# Patient Record
Sex: FEMALE | Race: WHITE | NOT HISPANIC OR LATINO | Employment: PART TIME | URBAN - METROPOLITAN AREA
[De-identification: names, ages, dates, MRNs, and addresses within clinical notes are randomized per-mention and may not be internally consistent; named-entity substitution may affect disease eponyms.]

---

## 2022-09-12 ENCOUNTER — HOSPITAL ENCOUNTER (EMERGENCY)
Facility: CLINIC | Age: 67
Discharge: HOME | End: 2022-09-12
Attending: EMERGENCY MEDICINE | Admitting: EMERGENCY MEDICINE
Payer: COMMERCIAL

## 2022-09-12 ENCOUNTER — APPOINTMENT (OUTPATIENT)
Dept: GENERAL RADIOLOGY | Facility: CLINIC | Age: 67
End: 2022-09-12
Attending: EMERGENCY MEDICINE
Payer: COMMERCIAL

## 2022-09-12 VITALS
OXYGEN SATURATION: 97 % | HEIGHT: 59 IN | TEMPERATURE: 97.7 F | RESPIRATION RATE: 13 BRPM | DIASTOLIC BLOOD PRESSURE: 75 MMHG | SYSTOLIC BLOOD PRESSURE: 133 MMHG | BODY MASS INDEX: 31.65 KG/M2 | WEIGHT: 157 LBS | HEART RATE: 67 BPM

## 2022-09-12 DIAGNOSIS — R68.84 JAW PAIN: ICD-10-CM

## 2022-09-12 DIAGNOSIS — R07.9 ACUTE CHEST PAIN: ICD-10-CM

## 2022-09-12 LAB
ANION GAP SERPL CALCULATED.3IONS-SCNC: 14 MMOL/L (ref 7–15)
BASOPHILS # BLD AUTO: 0.1 10E3/UL (ref 0–0.2)
BASOPHILS NFR BLD AUTO: 1 %
BUN SERPL-MCNC: 14.7 MG/DL (ref 8–23)
CALCIUM SERPL-MCNC: 9.7 MG/DL (ref 8.8–10.2)
CHLORIDE SERPL-SCNC: 103 MMOL/L (ref 98–107)
CREAT SERPL-MCNC: 0.62 MG/DL (ref 0.51–0.95)
DEPRECATED HCO3 PLAS-SCNC: 23 MMOL/L (ref 22–29)
EOSINOPHIL # BLD AUTO: 0.1 10E3/UL (ref 0–0.7)
EOSINOPHIL NFR BLD AUTO: 1 %
ERYTHROCYTE [DISTWIDTH] IN BLOOD BY AUTOMATED COUNT: 13 % (ref 10–15)
GFR SERPL CREATININE-BSD FRML MDRD: >90 ML/MIN/1.73M2
GLUCOSE SERPL-MCNC: 137 MG/DL (ref 70–99)
HCT VFR BLD AUTO: 45.4 % (ref 35–47)
HGB BLD-MCNC: 15 G/DL (ref 11.7–15.7)
HOLD SPECIMEN: NORMAL
HOLD SPECIMEN: NORMAL
IMM GRANULOCYTES # BLD: 0 10E3/UL
IMM GRANULOCYTES NFR BLD: 1 %
LYMPHOCYTES # BLD AUTO: 3.2 10E3/UL (ref 0.8–5.3)
LYMPHOCYTES NFR BLD AUTO: 37 %
MCH RBC QN AUTO: 28.8 PG (ref 26.5–33)
MCHC RBC AUTO-ENTMCNC: 33 G/DL (ref 31.5–36.5)
MCV RBC AUTO: 87 FL (ref 78–100)
MONOCYTES # BLD AUTO: 0.5 10E3/UL (ref 0–1.3)
MONOCYTES NFR BLD AUTO: 6 %
NEUTROPHILS # BLD AUTO: 4.8 10E3/UL (ref 1.6–8.3)
NEUTROPHILS NFR BLD AUTO: 54 %
NRBC # BLD AUTO: 0 10E3/UL
NRBC BLD AUTO-RTO: 0 /100
PLATELET # BLD AUTO: 309 10E3/UL (ref 150–450)
POTASSIUM SERPL-SCNC: 4.1 MMOL/L (ref 3.4–5.3)
RBC # BLD AUTO: 5.21 10E6/UL (ref 3.8–5.2)
SODIUM SERPL-SCNC: 140 MMOL/L (ref 136–145)
TROPONIN T SERPL HS-MCNC: 6 NG/L
TROPONIN T SERPL HS-MCNC: <6 NG/L
WBC # BLD AUTO: 8.7 10E3/UL (ref 4–11)

## 2022-09-12 PROCEDURE — 85025 COMPLETE CBC W/AUTO DIFF WBC: CPT | Performed by: EMERGENCY MEDICINE

## 2022-09-12 PROCEDURE — 36415 COLL VENOUS BLD VENIPUNCTURE: CPT | Performed by: EMERGENCY MEDICINE

## 2022-09-12 PROCEDURE — 99285 EMERGENCY DEPT VISIT HI MDM: CPT | Mod: 25

## 2022-09-12 PROCEDURE — 71046 X-RAY EXAM CHEST 2 VIEWS: CPT

## 2022-09-12 PROCEDURE — 84484 ASSAY OF TROPONIN QUANT: CPT | Performed by: EMERGENCY MEDICINE

## 2022-09-12 PROCEDURE — 82310 ASSAY OF CALCIUM: CPT | Performed by: EMERGENCY MEDICINE

## 2022-09-12 PROCEDURE — 93005 ELECTROCARDIOGRAM TRACING: CPT

## 2022-09-12 PROCEDURE — 93005 ELECTROCARDIOGRAM TRACING: CPT | Mod: 76

## 2022-09-12 RX ORDER — LIDOCAINE 40 MG/G
CREAM TOPICAL
Status: DISCONTINUED
Start: 2022-09-12 | End: 2022-09-12 | Stop reason: HOSPADM

## 2022-09-12 ASSESSMENT — ENCOUNTER SYMPTOMS
NERVOUS/ANXIOUS: 1
NECK PAIN: 1
SHORTNESS OF BREATH: 1
BACK PAIN: 1

## 2022-09-12 ASSESSMENT — ACTIVITIES OF DAILY LIVING (ADL): ADLS_ACUITY_SCORE: 35

## 2022-09-12 NOTE — ED TRIAGE NOTES
bilateral jaw pain that started about 1pm. Pain radiates down neck to chest. Has happened before, but resolved without intervention.

## 2022-09-13 LAB
ATRIAL RATE - MUSE: 67 BPM
ATRIAL RATE - MUSE: 92 BPM
DIASTOLIC BLOOD PRESSURE - MUSE: NORMAL MMHG
DIASTOLIC BLOOD PRESSURE - MUSE: NORMAL MMHG
INTERPRETATION ECG - MUSE: NORMAL
INTERPRETATION ECG - MUSE: NORMAL
P AXIS - MUSE: 39 DEGREES
P AXIS - MUSE: 56 DEGREES
PR INTERVAL - MUSE: 132 MS
PR INTERVAL - MUSE: 134 MS
QRS DURATION - MUSE: 78 MS
QRS DURATION - MUSE: 80 MS
QT - MUSE: 370 MS
QT - MUSE: 436 MS
QTC - MUSE: 457 MS
QTC - MUSE: 460 MS
R AXIS - MUSE: -25 DEGREES
R AXIS - MUSE: -34 DEGREES
SYSTOLIC BLOOD PRESSURE - MUSE: NORMAL MMHG
SYSTOLIC BLOOD PRESSURE - MUSE: NORMAL MMHG
T AXIS - MUSE: 20 DEGREES
T AXIS - MUSE: 51 DEGREES
VENTRICULAR RATE- MUSE: 67 BPM
VENTRICULAR RATE- MUSE: 92 BPM

## 2022-09-13 NOTE — DISCHARGE INSTRUCTIONS
Please monitor your symptoms closely.  Return immediately if develop recurrent pain, chest pain, shortness of breath, or any other new or troubling symptoms.    Follow-up with your primary care provider in 2 to 3 days for recheck.    You are always welcome to return to the ED for reassessment if any concerns arise    Discharge Instructions  Chest Pain    You have been seen today for chest pain or discomfort.  At this time, your provider has found no signs that your chest pain is due to a serious or life-threatening condition, (or you have declined more testing and/or admission to the hospital). However, sometimes there is a serious problem that does not show up right away. Your evaluation today may not be complete and you may need further testing and evaluation.     Generally, every Emergency Department visit should have a follow-up clinic visit with either a primary or a specialty clinic/provider. Please follow-up as instructed by your emergency provider today.  Return to the Emergency Department if:  Your chest pain changes, gets worse, starts to happen more often, or comes with less activity.  You are newly short of breath.  You get very weak or tired.  You pass out or faint.  You have any new symptoms, like fever, cough, numb legs, or you cough up blood.  You have anything else that worries you.    Until you follow-up with your regular provider, please do the following:  Take one aspirin daily unless you have an allergy or are told not to by your provider.  If a stress test appointment has been made, go to the appointment.  If you have questions, contact your regular provider.  Follow-up with your regular provider/clinic as directed; this is very important.    If you were given a prescription for medicine here today, be sure to read all of the information (including the package insert) that comes with your prescription.  This will include important information about the medicine, its side effects, and any  warnings that you need to know about.  The pharmacist who fills the prescription can provide more information and answer questions you may have about the medicine.  If you have questions or concerns that the pharmacist cannot address, please call or return to the Emergency Department.       Remember that you can always come back to the Emergency Department if you are not able to see your regular provider in the amount of time listed above, if you get any new symptoms, or if there is anything that worries you.

## 2022-09-13 NOTE — ED PROVIDER NOTES
History   Chief Complaint:  Jaw Pain and Chest Pain       The history is provided by the patient.      Virginia Pan is a 67 year old female who presents with jaw pain that radiates down her neck and into her chest and back. 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 Virginia notes that it lasted about 5 minutes before complete resolution.  She noted the pain began along the anterior lower jaw, that subsequently migrated down the anterior aspect of her neck into her chest and back.  When it began, the patient noted that she was under significant stress at work.  She also notes her sister is in the hospital, and her  is currently undergoing cancer treatment through [facility].  She immediately stood up and walked around.  She denied that her symptoms exacerbated with exertion.  She denied any radiation of pain towards her shoulders nor down her arms.  She noted mild shortness of breath though denied any diaphoresis or nausea.  She notes a similar episode of pain that occurred approximately 1 year previous after her aunt .  That 2 lasted for about 5 minutes.  He notes a prior history of heartburn, though states her symptoms feel distinctly different than that.  She was not chewing or biting at the time of her symptom onset today.  Her symptoms resolved without any medication.  Here in the ED, she is otherwise asymptomatic.  She has had no recurrence of symptoms over the course of the day.  She denies any pain to the remainder of her joints.  She denies any clicking of her jaw.  She has no prior history of TMJ dysfunction.  She denies any history of known cardiac disease including coronary artery disease, nor she diagnosed with hypertension, hyperlipidemia, nor diabetes.  She has no personal or family history of aortic aneurysm or aortic dissection.  She is unaware of any history of collagen vascular disease such as Marfan's or Jody-Danlos syndrome.   "She is unsure if she grinds her teeth at night.  She denies any recent dental procedures, focal dental pain, facial swelling, or difficulty swallowing.      Review of Systems   HENT: Negative for dental problem.    Respiratory: Positive for shortness of breath.    Cardiovascular: Positive for chest pain.   Musculoskeletal: Positive for back pain and neck pain.        Jaw pain (+)   Psychiatric/Behavioral: The patient is nervous/anxious.         Stress (+)   All other systems reviewed and are negative.      Allergies:  Quinolones  Clindamycin  Ofloxacin    Medications:  Denies regular medication use    Past Medical History:     AUB  Overweight     Past Surgical History:    Cervical colposcopy, biopsy, and endocervical curettage  D&C  Laparoscopy  Cervical LEEP  Rectovaginal fistula repair  Tubal ligation  Salpingectomy  Hysteroscopy with ablation  Breast reduction  Abdominoplasty     Social History:  The patient presents to the ED with her  Jen.  The patient presents to the ED via car.  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  Her sister is currently hospitalized.  Her  has cancer.  She is stressed at work  PCP: eLonor Montgomery     Physical Exam     Patient Vitals for the past 24 hrs:   BP Temp Temp src Pulse Resp SpO2 Height Weight   09/12/22 2150 133/75 -- -- 67 -- 97 % -- --   09/12/22 2030 (!) 141/76 -- -- 68 13 98 % -- --   09/12/22 2020 -- -- -- 71 20 95 % -- --   09/12/22 2010 -- -- -- 68 11 98 % -- --   09/12/22 2000 (!) 151/85 -- -- 72 20 100 % -- --   09/12/22 1611 (!) 149/75 -- -- -- -- -- -- --   09/12/22 1610 -- 97.7  F (36.5  C) Temporal 91 16 99 % 1.499 m (4' 11\") 71.2 kg (157 lb)       Physical Exam  General:              Well-nourished              Speaking in full sentences  Eyes:              Conjunctiva without injection or scleral icterus              PERRL              EOM full w/out entrapment or proptosis  ENT:              Moist mucous membranes              Posterior " oropharynx clear without erythema or exudate              No tonsillar hypertrophy, exudate, asymmetry, nor uvular deviation              No oral lesions              Nares patent              Pinnae normal              No midface swelling, erythema, or asymmetry              Mild tenderness to palpation over TMJ joint  Neck:              Full ROM              No stiffness appreciated              No overlying skin changes or swelling  Resp:              Lungs CTAB              No crackles, wheezing or audible rubs              Good air movement  CV:                    Normal rate, regular rhythm              S1 and S2 present              No murmur, gallop or rub   Radial pulse 2+ bilaterally and symmetric  GI:              BS present              Abdomen soft without distention              Non-tender to light and deep palpation              No guarding or rebound tenderness  Skin:              Warm, dry, well perfused              No rashes or open wounds on exposed skin  MSK:              Moves all extremities              No focal deformities or swelling  Neuro:              Alert              Answers questions appropriately              Moves all extremities equally              Gait stable  Psych:              Normal affect, normal mood    Emergency Department Course   ECG #1  ECG taken at 1601, ECG read at 2017  Normal sinus rhythm   Left axis deviation  Non-specific ST and T wave abnormality    Rate 92 bpm. PA interval 134 ms. QRS duration 78 ms. QT/QTc 370/457 ms. P-R-T axes 39 -34 51.     ECG #2  ECG taken at 2038, ECG read at 2042  Sinus rhythm, non-specific ST - T wave abnormality    Rate 67 bpm. PA interval 132 ms. QRS duration 80 ms. QT/QTc 436/460 ms. P-R-T axes 56 -25 20.     Imaging:  Chest XR,  PA & LAT   Final Result   IMPRESSION: Negative chest.        Report per radiology    Laboratory:  Labs Ordered and Resulted from Time of ED Arrival to Time of ED Departure   BASIC METABOLIC PANEL - Abnormal        Result Value    Creatinine 0.62      Sodium 140      Potassium 4.1      Urea Nitrogen 14.7      Chloride 103      Carbon Dioxide (CO2) 23      Anion Gap 14      Glucose 137 (*)     GFR Estimate >90      Calcium 9.7     CBC WITH PLATELETS AND DIFFERENTIAL - Abnormal    WBC Count 8.7      RBC Count 5.21 (*)     Hemoglobin 15.0      Hematocrit 45.4      MCV 87      MCH 28.8      MCHC 33.0      RDW 13.0      Platelet Count 309      % Neutrophils 54      % Lymphocytes 37      % Monocytes 6      % Eosinophils 1      % Basophils 1      % Immature Granulocytes 1      NRBCs per 100 WBC 0      Absolute Neutrophils 4.8      Absolute Lymphocytes 3.2      Absolute Monocytes 0.5      Absolute Eosinophils 0.1      Absolute Basophils 0.1      Absolute Immature Granulocytes 0.0      Absolute NRBCs 0.0     TROPONIN T, HIGH SENSITIVITY - Normal    Troponin T, High Sensitivity <6     TROPONIN T, HIGH SENSITIVITY - Normal    Troponin T, High Sensitivity 6              Emergency Department Course:     Reviewed:  I reviewed nursing notes, vitals, past medical history and Care Everywhere    Assessments:  2017 I obtained history and examined the patient as noted above.   2140 I rechecked the patient and explained findings. The patient remains asymptomatic. We talked about risk benefits of further evaluation for aortic pathology complete CT imaging. She does not want to pursue any additional imaging.    Disposition:  The patient was discharged to home.     Impression & Plan     Medical Decision Making:  Virginia Pan is a very pleasant 67-year-old female who presents to the ED accompanied by her spouse for evaluation of jaw pain and chest pain.  VS on presentation reveal elevated BP which improved during her ED course.  Differential diagnosis is broad, including though was not limited to, referred cardiac ischemia, pneumothorax, pneumonia, esophageal spasm, esophageal rupture, TMJ, pharyngitis, deep space neck infection, facial  cellulitis, referred odontogenic source, aortic dissection, aortic aneurysm, acute stress reaction, among others.  History, exam, and ED course as outlined above.  At this time, precise etiology for patient's transient jaw and chest discomfort is not entirely clear.  Acute coronary syndrome strongly considered though at present felt to be unlikely.  She has atypical features and that her symptoms began at rest, were not exacerbated by exertional activities.  For initial EKG demonstrates sinus rhythm, nonspecific ST-T wave changes, left axis deviation, though no ST segment elevation.  Subsequent EKG with nonspecific T wave in lead III, though otherwise without acute change compared with previous.  Her initial and repeat high-sensitivity troponin have returned within normal limits, and given duration of symptoms following initial episode, do feel ACS to be unlikely.  She has no history of underlying coronary artery disease nor diagnosed risk factors.  History evaluation not suggestive of pulmonary embolism in the absence of shortness of breath, tachycardia, hypoxia, and given transient nature of symptoms.  Chest x-ray negative for pneumothorax or pneumonia.  She denies any recent dental work, has no focal dental tenderness, no signs of odontogenic abscess, facial cellulitis, pharyngitis, nor deep space neck infection.  Aortic dissection also strongly considered given the constellation of jaw pain, neck pain, and chest/back pain.  We discussed this diagnostic entity at length.  Here in the ED, she is resting comfortably, and has been without any pain and says it initially occurred at 1 PM this afternoon lasting only for 5 minutes.  We discussed this would be somewhat atypical for aortic dissection given the transient and complete resolution of pain, as aortic dissection typically involves more persistent discomfort.  Chest x-ray is without evidence of widened mediastinum and radial pulses are 2+ bilaterally and  symmetric.  She has no history of underlying collagen vascular disease or family history of aortic aneurysm/dissection.  We discussed options for further evaluation including CT of the chest, the patient is electing to forego this at this time which I do feel to be reasonable.  Stress may be playing a role as she notes the episode today occurred during a time of significant stress.  She also notes experiencing a similar episode about 1 year previous, also around a time of significant stress.  Her laboratory evaluation otherwise reveals unremarkable CBC and metabolic panel aside from mild hyperglycemia.  This can be monitored again as an outpatient.  Overall, patient remains well-appearing, and asymptomatic during her entire ED course.  She is requesting discharge home which I do feel to be reasonable.  We discussed return precautions including any recurrent jaw pain, neck discomfort, chest discomfort, back discomfort, or new or troubling symptoms.  She feels very comfortable with the plan of care and will follow-up with PCP later this coming week for recheck.  All of her questions have answered prior to discharge.    Diagnosis:    ICD-10-CM    1. Jaw pain  R68.84    2. Acute chest pain  R07.9      Scribe Disclosure:  I, Agustin Nehemias, am serving as a scribe at 8:16 PM on 9/12/2022 to document services personally performed by Vijay Reis MD based on my observations and the provider's statements to me.          Vijay Reis MD  09/13/22 0039       Vijay Reis MD  09/13/22 0040       Vijay Reis MD  09/13/22 0045

## 2024-03-11 ENCOUNTER — OFFICE VISIT (OUTPATIENT)
Dept: URGENT CARE | Facility: URGENT CARE | Age: 69
End: 2024-03-11
Payer: COMMERCIAL

## 2024-03-11 VITALS
WEIGHT: 160 LBS | BODY MASS INDEX: 32.32 KG/M2 | HEART RATE: 88 BPM | SYSTOLIC BLOOD PRESSURE: 118 MMHG | RESPIRATION RATE: 14 BRPM | TEMPERATURE: 97.5 F | DIASTOLIC BLOOD PRESSURE: 82 MMHG | OXYGEN SATURATION: 98 %

## 2024-03-11 DIAGNOSIS — J06.9 VIRAL UPPER RESPIRATORY TRACT INFECTION WITH COUGH: Primary | ICD-10-CM

## 2024-03-11 DIAGNOSIS — B96.89 BACTERIAL CONJUNCTIVITIS OF BOTH EYES: ICD-10-CM

## 2024-03-11 DIAGNOSIS — H10.9 BACTERIAL CONJUNCTIVITIS OF BOTH EYES: ICD-10-CM

## 2024-03-11 LAB
DEPRECATED S PYO AG THROAT QL EIA: NEGATIVE
FLUAV AG SPEC QL IA: NEGATIVE
FLUBV AG SPEC QL IA: NEGATIVE
GROUP A STREP BY PCR: NOT DETECTED

## 2024-03-11 PROCEDURE — 99203 OFFICE O/P NEW LOW 30 MIN: CPT | Performed by: PHYSICIAN ASSISTANT

## 2024-03-11 PROCEDURE — 87651 STREP A DNA AMP PROBE: CPT | Performed by: PHYSICIAN ASSISTANT

## 2024-03-11 PROCEDURE — 87804 INFLUENZA ASSAY W/OPTIC: CPT | Performed by: PHYSICIAN ASSISTANT

## 2024-03-11 RX ORDER — POLYMYXIN B SULFATE AND TRIMETHOPRIM 1; 10000 MG/ML; [USP'U]/ML
1-2 SOLUTION OPHTHALMIC EVERY 4 HOURS
Qty: 10 ML | Refills: 0 | Status: SHIPPED | OUTPATIENT
Start: 2024-03-11 | End: 2024-03-18

## 2024-03-11 NOTE — PROGRESS NOTES
URGENT CARE VISIT:    SUBJECTIVE:   Virginia Pan is a 68 year old female presenting with a chief complaint of fever, runny nose, cough - productive, sore throat, facial pain/pressure, and headache.  Onset was 5 day(s) ago. She started having red eyes with mattering today.  She denies the following symptoms: shortness of breath  Course of illness is same.    Treatment measures tried include None tried with no relief of symptoms.  Predisposing factors include exposure to strep.    PMH: History reviewed. No pertinent past medical history.  Allergies: Quinolones   Medications:   Current Outpatient Medications   Medication Sig Dispense Refill    polymixin b-trimethoprim (POLYTRIM) 78588-1.1 UNIT/ML-% ophthalmic solution Place 1-2 drops into both eyes every 4 hours for 7 days 10 mL 0    estradiol-norethindrone (COMBIPATCH) 0.05-0.14 MG/DAY Place 1 patch onto the skin twice a week (Patient not taking: Reported on 3/11/2024)      HYDROcodone-acetaminophen (NORCO) 5-325 MG per tablet Take 1-2 tablets by mouth every 4 hours as needed for other (Moderate to Severe Pain) (Patient not taking: Reported on 3/11/2024) 10 tablet 0    NO ACTIVE MEDICATIONS . (Patient not taking: Reported on 3/11/2024)       Social History:   Social History     Tobacco Use    Smoking status: Never    Smokeless tobacco: Not on file   Substance Use Topics    Alcohol use: Yes       ROS:  Review of systems negative except as stated above.    OBJECTIVE:  /82 (BP Location: Right arm, Patient Position: Chair, Cuff Size: Adult Regular)   Pulse 88   Temp 97.5  F (36.4  C) (Oral)   Resp 14   Wt 72.6 kg (160 lb)   SpO2 98%   BMI 32.32 kg/m    GENERAL APPEARANCE: healthy, alert and no distress  EYES: EOMI,  PERRL, conjunctival injected bilaterally  HENT: ear canals and TM's normal.  Nose and mouth without ulcers, erythema or lesions  NECK: supple, nontender, no lymphadenopathy  RESP: lungs clear to auscultation - no rales, rhonchi or wheezes  CV:  regular rates and rhythm, normal S1 S2, no murmur noted  SKIN: no suspicious lesions or rashes    Labs:    Results for orders placed or performed in visit on 03/11/24   Streptococcus A Rapid Screen w/Reflex to PCR - Clinic Collect     Status: Normal    Specimen: Throat; Swab   Result Value Ref Range    Group A Strep antigen Negative Negative   Influenza A/B antigen     Status: Normal    Specimen: Nose; Swab   Result Value Ref Range    Influenza A antigen Negative Negative    Influenza B antigen Negative Negative    Narrative    Test results must be correlated with clinical data. If necessary, results should be confirmed by a molecular assay or viral culture.       ASSESSMENT:    ICD-10-CM    1. Viral upper respiratory tract infection with cough  J06.9 Streptococcus A Rapid Screen w/Reflex to PCR - Clinic Collect     Group A Streptococcus PCR Throat Swab      2. Bacterial conjunctivitis of both eyes  H10.9 Influenza A/B antigen    B96.89 polymixin b-trimethoprim (POLYTRIM) 86492-7.1 UNIT/ML-% ophthalmic solution          PLAN:  Patient Instructions   Patient was educated on the natural course of viral illness with secondary conjunctivitis. Conservative measures discussed including increased fluids, nasal saline irrigation (neti pot), warm steamy shower, salt water gargles, cough suppressants, expectorants (Mucinex), and analgesics (Tylenol and/or Ibuprofen). See your primary care provider if symptoms worsen or do not improve in 7 days. Seek emergency care if you develop fever over 104 or shortness of breath.    Patient verbalized understanding and is agreeable to plan. The patient was discharged ambulatory and in stable condition.    MEL Figueredo...................  3/11/2024   11:13 AM

## 2024-03-11 NOTE — PATIENT INSTRUCTIONS
Patient was educated on the natural course of viral illness with secondary conjunctivitis. Conservative measures discussed including increased fluids, nasal saline irrigation (neti pot), warm steamy shower, salt water gargles, cough suppressants, expectorants (Mucinex), and analgesics (Tylenol and/or Ibuprofen). See your primary care provider if symptoms worsen or do not improve in 7 days. Seek emergency care if you develop fever over 104 or shortness of breath.